# Patient Record
Sex: FEMALE | Race: WHITE | Employment: UNEMPLOYED | URBAN - METROPOLITAN AREA
[De-identification: names, ages, dates, MRNs, and addresses within clinical notes are randomized per-mention and may not be internally consistent; named-entity substitution may affect disease eponyms.]

---

## 2024-10-29 ENCOUNTER — HOSPITAL ENCOUNTER (EMERGENCY)
Facility: HOSPITAL | Age: 49
Discharge: HOME | End: 2024-10-29
Attending: EMERGENCY MEDICINE | Admitting: EMERGENCY MEDICINE

## 2024-10-29 ENCOUNTER — APPOINTMENT (EMERGENCY)
Dept: CT IMAGING | Facility: HOSPITAL | Age: 49
End: 2024-10-29

## 2024-10-29 ENCOUNTER — APPOINTMENT (EMERGENCY)
Dept: ULTRASOUND IMAGING | Facility: HOSPITAL | Age: 49
End: 2024-10-29

## 2024-10-29 VITALS
HEART RATE: 82 BPM | WEIGHT: 168.87 LBS | OXYGEN SATURATION: 100 % | RESPIRATION RATE: 20 BRPM | TEMPERATURE: 98.2 F | DIASTOLIC BLOOD PRESSURE: 90 MMHG | SYSTOLIC BLOOD PRESSURE: 160 MMHG

## 2024-10-29 DIAGNOSIS — N70.93 PYOSALPINX: Primary | ICD-10-CM

## 2024-10-29 LAB
ALBUMIN SERPL BCG-MCNC: 3.9 G/DL (ref 3.5–5)
ALP SERPL-CCNC: 58 U/L (ref 34–104)
ALT SERPL W P-5'-P-CCNC: 10 U/L (ref 7–52)
ANION GAP SERPL CALCULATED.3IONS-SCNC: 12 MMOL/L (ref 4–13)
AST SERPL W P-5'-P-CCNC: 12 U/L (ref 13–39)
BACTERIA UR QL AUTO: ABNORMAL /HPF
BASOPHILS # BLD AUTO: 0.04 THOUSANDS/ΜL (ref 0–0.1)
BASOPHILS NFR BLD AUTO: 0 % (ref 0–1)
BILIRUB SERPL-MCNC: 0.72 MG/DL (ref 0.2–1)
BILIRUB UR QL STRIP: NEGATIVE
BUN SERPL-MCNC: 8 MG/DL (ref 5–25)
CALCIUM SERPL-MCNC: 8.7 MG/DL (ref 8.4–10.2)
CHLORIDE SERPL-SCNC: 99 MMOL/L (ref 96–108)
CLARITY UR: CLEAR
CO2 SERPL-SCNC: 23 MMOL/L (ref 21–32)
COLOR UR: ABNORMAL
CREAT SERPL-MCNC: 0.9 MG/DL (ref 0.6–1.3)
EOSINOPHIL # BLD AUTO: 0.02 THOUSAND/ΜL (ref 0–0.61)
EOSINOPHIL NFR BLD AUTO: 0 % (ref 0–6)
ERYTHROCYTE [DISTWIDTH] IN BLOOD BY AUTOMATED COUNT: 12.6 % (ref 11.6–15.1)
EXT PREGNANCY TEST URINE: NEGATIVE
EXT. CONTROL: NORMAL
GFR SERPL CREATININE-BSD FRML MDRD: 76 ML/MIN/1.73SQ M
GLUCOSE SERPL-MCNC: 150 MG/DL (ref 65–140)
GLUCOSE UR STRIP-MCNC: NEGATIVE MG/DL
HCT VFR BLD AUTO: 36.2 % (ref 34.8–46.1)
HGB BLD-MCNC: 11.9 G/DL (ref 11.5–15.4)
HGB UR QL STRIP.AUTO: 25
HYALINE CASTS #/AREA URNS LPF: ABNORMAL /LPF
IMM GRANULOCYTES # BLD AUTO: 0.07 THOUSAND/UL (ref 0–0.2)
IMM GRANULOCYTES NFR BLD AUTO: 1 % (ref 0–2)
KETONES UR STRIP-MCNC: ABNORMAL MG/DL
LEUKOCYTE ESTERASE UR QL STRIP: 25
LIPASE SERPL-CCNC: 13 U/L (ref 11–82)
LYMPHOCYTES # BLD AUTO: 2.7 THOUSANDS/ΜL (ref 0.6–4.47)
LYMPHOCYTES NFR BLD AUTO: 22 % (ref 14–44)
MCH RBC QN AUTO: 29.5 PG (ref 26.8–34.3)
MCHC RBC AUTO-ENTMCNC: 32.9 G/DL (ref 31.4–37.4)
MCV RBC AUTO: 90 FL (ref 82–98)
MONOCYTES # BLD AUTO: 0.83 THOUSAND/ΜL (ref 0.17–1.22)
MONOCYTES NFR BLD AUTO: 7 % (ref 4–12)
MUCOUS THREADS UR QL AUTO: ABNORMAL
NEUTROPHILS # BLD AUTO: 8.39 THOUSANDS/ΜL (ref 1.85–7.62)
NEUTS SEG NFR BLD AUTO: 70 % (ref 43–75)
NITRITE UR QL STRIP: NEGATIVE
NON-SQ EPI CELLS URNS QL MICRO: ABNORMAL /HPF
NRBC BLD AUTO-RTO: 0 /100 WBCS
PH UR STRIP.AUTO: 8 [PH]
PLATELET # BLD AUTO: 269 THOUSANDS/UL (ref 149–390)
PMV BLD AUTO: 11.3 FL (ref 8.9–12.7)
POTASSIUM SERPL-SCNC: 3.6 MMOL/L (ref 3.5–5.3)
PROT SERPL-MCNC: 7.3 G/DL (ref 6.4–8.4)
PROT UR STRIP-MCNC: ABNORMAL MG/DL
RBC # BLD AUTO: 4.04 MILLION/UL (ref 3.81–5.12)
RBC #/AREA URNS AUTO: ABNORMAL /HPF
SODIUM SERPL-SCNC: 134 MMOL/L (ref 135–147)
SP GR UR STRIP.AUTO: 1.01 (ref 1–1.04)
UROBILINOGEN UA: 1 MG/DL
WBC # BLD AUTO: 12.05 THOUSAND/UL (ref 4.31–10.16)
WBC #/AREA URNS AUTO: ABNORMAL /HPF

## 2024-10-29 PROCEDURE — 96374 THER/PROPH/DIAG INJ IV PUSH: CPT

## 2024-10-29 PROCEDURE — 81001 URINALYSIS AUTO W/SCOPE: CPT

## 2024-10-29 PROCEDURE — 96375 TX/PRO/DX INJ NEW DRUG ADDON: CPT

## 2024-10-29 PROCEDURE — 74177 CT ABD & PELVIS W/CONTRAST: CPT

## 2024-10-29 PROCEDURE — NC001 PR NO CHARGE: Performed by: OBSTETRICS & GYNECOLOGY

## 2024-10-29 PROCEDURE — 87491 CHLMYD TRACH DNA AMP PROBE: CPT

## 2024-10-29 PROCEDURE — 93005 ELECTROCARDIOGRAM TRACING: CPT

## 2024-10-29 PROCEDURE — 36415 COLL VENOUS BLD VENIPUNCTURE: CPT

## 2024-10-29 PROCEDURE — 87591 N.GONORRHOEAE DNA AMP PROB: CPT

## 2024-10-29 PROCEDURE — 85025 COMPLETE CBC W/AUTO DIFF WBC: CPT

## 2024-10-29 PROCEDURE — 81003 URINALYSIS AUTO W/O SCOPE: CPT

## 2024-10-29 PROCEDURE — 76830 TRANSVAGINAL US NON-OB: CPT

## 2024-10-29 PROCEDURE — 81025 URINE PREGNANCY TEST: CPT

## 2024-10-29 PROCEDURE — 80053 COMPREHEN METABOLIC PANEL: CPT

## 2024-10-29 PROCEDURE — 99285 EMERGENCY DEPT VISIT HI MDM: CPT

## 2024-10-29 PROCEDURE — 83690 ASSAY OF LIPASE: CPT

## 2024-10-29 PROCEDURE — 76856 US EXAM PELVIC COMPLETE: CPT

## 2024-10-29 PROCEDURE — 99284 EMERGENCY DEPT VISIT MOD MDM: CPT

## 2024-10-29 PROCEDURE — 96361 HYDRATE IV INFUSION ADD-ON: CPT

## 2024-10-29 RX ORDER — KETOROLAC TROMETHAMINE 30 MG/ML
15 INJECTION, SOLUTION INTRAMUSCULAR; INTRAVENOUS ONCE
Status: COMPLETED | OUTPATIENT
Start: 2024-10-29 | End: 2024-10-29

## 2024-10-29 RX ORDER — ONDANSETRON 2 MG/ML
4 INJECTION INTRAMUSCULAR; INTRAVENOUS ONCE
Status: COMPLETED | OUTPATIENT
Start: 2024-10-29 | End: 2024-10-29

## 2024-10-29 RX ADMIN — KETOROLAC TROMETHAMINE 15 MG: 30 INJECTION, SOLUTION INTRAMUSCULAR; INTRAVENOUS at 10:09

## 2024-10-29 RX ADMIN — IOHEXOL 100 ML: 350 INJECTION, SOLUTION INTRAVENOUS at 11:19

## 2024-10-29 RX ADMIN — ONDANSETRON 4 MG: 2 INJECTION INTRAMUSCULAR; INTRAVENOUS at 10:09

## 2024-10-29 RX ADMIN — SODIUM CHLORIDE 1000 ML: 0.9 INJECTION, SOLUTION INTRAVENOUS at 09:50

## 2024-10-29 NOTE — DISCHARGE INSTR - AVS FIRST PAGE
Please call [phone] prior to your appointment on 10/31 to talk to the financial counselors about insurance!

## 2024-10-29 NOTE — ED PROVIDER NOTES
Time reflects when diagnosis was documented in both MDM as applicable and the Disposition within this note       Time User Action Codes Description Comment    10/29/2024  2:55 PM Mary Garcia Add [N70.93] Pyosalpinx           ED Disposition       ED Disposition   Discharge    Condition   Stable    Date/Time   Tue Oct 29, 2024  3:18 PM    Comment   Yuki Philip discharge to home/self care.                   Assessment & Plan       Medical Decision Making  49 year old female presenting to the ED for left lower quadrant abdominal/pelvic pain x three weeks. On exam there is no peritonitis but is tender to palpation. Differential diagnosis to include but not limited to: PID, ovarian torsion, diverticulitis. CBC with minor leukocytosis at 12, no tachycardia or fever to associate. CMP with hyperglycemia. UA without signs for infection. Negative pregnancy test. CT returned with 6.0 x 1.6 cm mildly thick walled tubular structure with central hypodensity in the left adnexa possibly representing fallopian tube with hydrosalpinx/pyosalpinx. Some heterogeneity in the left adnexa more inferiorly, with poorly defined left ovary. Possibility of PID/early tubo-ovarian abscess cannot be completely excluded.  Correlate with physical exam and pelvic ultrasound. 2. Small amount of pelvic free fluid with suspected enhancement of the pelvic peritoneum, possibly reactive from regional inflammation/infection. 3. Mildly thickened urinary bladder possibly underdistention versus cystitis.  Ultrasound returned with Dilated/thickened left fallopian tube with central fluid. Given the additional findings on CT, this is concerning for pyosalpinx and clinical correlation is recommended. Hematosalpinx secondary to endometriosis or ectopic pregnancy is less likely. 2.  Left ovary difficult to discretely visualize secondary to adjacent thickened tube though there do appear to be normal small follicles. 3.  Heterogeneous myometrium likely related  "to adenomyosis. A small focus of hypoechogenicity is likely related to adenomyosis with fibroid less likely.    Discussed case with Dr. Torres from [facility]. Stated she is stable for outpatient management without needing IV ABX or oral ABX. Will need to follow up in office in two days for surgical consult. Patient and family verbalize understanding.    Pt stable at time of discharge, vital signs reviewed, questions answered. Strict ER return precautions provided/discussed and were well understood by patient. Patient's vitals, labs and/or imaging results, diagnosis, and treatment plan were discussed with the patient. All new and/or changed medications were discussed - specifically to include route of administration, how often to take, when to take, and the pharmacy they were sent to. Strict return precautions as well as close follow up with PCP was discussed with the patient and the patient was agreeable to my recommendations.  Patient verbally acknowledged understanding. All labs, imaging were reviewed and used in the medical decision making process (if ordered).     Portions of this chart may have been written with voice recognition software.  Occasional grammatical errors, wrong word or \"sound a like\" substitutions may have occurred due to software limitations.  Please read carefully and use context to recognize where substitutions have occurred.      Problems Addressed:  Pyosalpinx: acute illness or injury    Amount and/or Complexity of Data Reviewed  Labs: ordered. Decision-making details documented in ED Course.  Radiology: ordered. Decision-making details documented in ED Course.  Discussion of management or test interpretation with external provider(s): Discussed case with Dr. Torres with [facility]. He states we could admit for IV ABX, though possible the ABX would not penetrate this possible chronic infection. Vital signs are stable and no overtly elevated leukocytosis. If pt feels comfortable with discharge " home, could follow up outpatient this Thursday (two days) for consult regarding removing the fallopian tube.    Risk  Prescription drug management.  Decision regarding hospitalization.        ED Course as of 10/29/24 1555   Tue Oct 29, 2024   1013 WBC(!): 12.05   1013 PREGNANCY TEST URINE: Negative   1016 Leukocytes, UA(!): 25.0   1016 Blood, UA(!): 25.0   1031 GLUCOSE(!): 150   1031 LIPASE: 13   1149 CT abdomen pelvis with contrast   1150 CT abdomen pelvis with contrast  Left Fallopian thickening    1210 CT abdomen pelvis with contrast  IMPRESSION:     1. 6.0 x 1.6 cm mildly thick walled tubular structure with central hypodensity in the left adnexa possibly representing fallopian tube with hydrosalpinx/pyosalpinx. Some heterogeneity in the left adnexa more inferiorly, with poorly defined left ovary.   Possibility of PID/early tubo-ovarian abscess cannot be completely excluded.  Correlate with physical exam and pelvic ultrasound.     2. Small amount of pelvic free fluid with suspected enhancement of the pelvic peritoneum, possibly reactive from regional inflammation/infection.     3. Mildly thickened urinary bladder possibly underdistention versus cystitis.     1355 US pelvis complete w transvaginal  IMPRESSION:     1.  Dilated/thickened left fallopian tube with central fluid. Given the additional findings on CT, this is concerning for pyosalpinx and clinical correlation is recommended. Hematosalpinx secondary to endometriosis or ectopic pregnancy is less likely.     2.  Left ovary difficult to discretely visualize secondary to adjacent thickened tube though there do appear to be normal small follicles.     3.  Heterogeneous myometrium likely related to adenomyosis. A small focus of hypoechogenicity is likely related to adenomyosis with fibroid less likely.            Medications   sodium chloride 0.9 % bolus 1,000 mL (0 mL Intravenous Stopped 10/29/24 1100)   ketorolac (TORADOL) injection 15 mg (15 mg Intravenous  Given 10/29/24 1009)   ondansetron (ZOFRAN) injection 4 mg (4 mg Intravenous Given 10/29/24 1009)       ED Risk Strat Scores                                               History of Present Illness       Chief Complaint   Patient presents with    Abdominal Pain       No past medical history on file.   No past surgical history on file.   No family history on file.       No existing history information found.   No existing history information found.   I have reviewed and agree with the history as documented.     Yuki is a 49 year old female presenting to the ED for left lower abdominal pain and periumbilical abdominal pain x three weeks. States her last bowel movement was sometime in the last month. Admits to fever last night. Denies chest pain, palpitations, cough, shortness of breath, nausea, vomiting, diarrhea, dysuria, urinary frequency, urinary urgency, hematuria. Unsure LMP. The pain is severe, has been this severe for the previous three weeks, sometimes the pain does radiate to the flank. Coming in today due to pain when trying to stand up, though she has been having this for three weeks.        Review of Systems   Constitutional:  Positive for fever. Negative for chills.   HENT:  Negative for congestion and rhinorrhea.    Respiratory:  Negative for cough and shortness of breath.    Cardiovascular:  Negative for chest pain and palpitations.   Gastrointestinal:  Positive for abdominal pain and constipation (? maybe). Negative for diarrhea, nausea and vomiting.   Genitourinary:  Positive for flank pain. Negative for decreased urine volume, dysuria, frequency, hematuria, urgency, vaginal bleeding, vaginal discharge and vaginal pain.   Musculoskeletal:  Negative for myalgias, neck pain and neck stiffness.   Skin:  Negative for rash.   Neurological:  Negative for light-headedness and headaches.           Objective       ED Triage Vitals [10/29/24 0942]   Temperature Pulse Blood Pressure Respirations SpO2 Patient  Position - Orthostatic VS   98.2 °F (36.8 °C) 82 160/90 20 100 % Lying      Temp Source Heart Rate Source BP Location FiO2 (%) Pain Score    Oral Monitor Right arm -- 10 - Worst Possible Pain      Vitals      Date and Time Temp Pulse SpO2 Resp BP Pain Score FACES Pain Rating User   10/29/24 1101 -- -- -- -- -- 5 -- AS   10/29/24 1009 -- -- -- -- -- 10 - Worst Possible Pain -- AS   10/29/24 0942 98.2 °F (36.8 °C) 82 100 % 20 160/90 10 - Worst Possible Pain -- AS            Physical Exam  Vitals reviewed.   Constitutional:       General: She is in acute distress.      Appearance: Normal appearance. She is ill-appearing. She is not toxic-appearing or diaphoretic.      Comments: Upon arrival to the ED patient is screaming, yelling in pain. Grabbing her left lower abdomen rather than pelvic pain. Is also noting periumbilical pain.    HENT:      Head: Normocephalic and atraumatic.      Right Ear: External ear normal.      Left Ear: External ear normal.      Nose: Nose normal.   Eyes:      General: No scleral icterus.        Right eye: No discharge.         Left eye: No discharge.      Extraocular Movements: Extraocular movements intact.      Conjunctiva/sclera: Conjunctivae normal.   Cardiovascular:      Rate and Rhythm: Normal rate and regular rhythm.      Pulses: Normal pulses.      Heart sounds: Normal heart sounds.   Pulmonary:      Effort: Pulmonary effort is normal. No respiratory distress.      Breath sounds: Normal breath sounds.   Abdominal:      General: There is no distension.      Palpations: Abdomen is soft.      Tenderness: There is abdominal tenderness (generalized). There is no right CVA tenderness, left CVA tenderness, guarding or rebound.      Comments: No peritonitis.    Musculoskeletal:         General: Normal range of motion.      Cervical back: Normal range of motion and neck supple. No rigidity or tenderness.   Lymphadenopathy:      Cervical: No cervical adenopathy.   Skin:     General: Skin is warm  and dry.      Capillary Refill: Capillary refill takes less than 2 seconds.   Neurological:      General: No focal deficit present.      Mental Status: She is alert.         Results Reviewed       Procedure Component Value Units Date/Time    Chlamydia/GC amplified DNA by PCR [[id number]] Collected: 10/29/24 1427    Lab Status: In process Updated: 10/29/24 1427    Urine Microscopic [[id number]]  (Abnormal) Collected: 10/29/24 1002    Lab Status: Final result Specimen: Urine, Clean Catch Updated: 10/29/24 1049     RBC, UA 0-1 /hpf      WBC, UA 1-2 /hpf      Epithelial Cells Moderate /hpf      Bacteria, UA None Seen /hpf      Hyaline Casts, UA 2-4 /lpf      MUCUS THREADS Moderate    Comprehensive metabolic panel [[id number]]  (Abnormal) Collected: 10/29/24 0951    Lab Status: Final result Specimen: Blood from Arm, Left Updated: 10/29/24 1027     Sodium 134 mmol/L      Potassium 3.6 mmol/L      Chloride 99 mmol/L      CO2 23 mmol/L      ANION GAP 12 mmol/L      BUN 8 mg/dL      Creatinine 0.90 mg/dL      Glucose 150 mg/dL      Calcium 8.7 mg/dL      AST 12 U/L      ALT 10 U/L      Alkaline Phosphatase 58 U/L      Total Protein 7.3 g/dL      Albumin 3.9 g/dL      Total Bilirubin 0.72 mg/dL      eGFR 76 ml/min/1.73sq m     Narrative:      National Kid[employer] guidelines for Chronic Kidney Disease (CKD):     Stage 1 with normal or high GFR (GFR > 90 mL/min/1.73 square meters)    Stage 2 Mild CKD (GFR = 60-89 mL/min/1.73 square meters)    Stage 3A Moderate CKD (GFR = 45-59 mL/min/1.73 square meters)    Stage 3B Moderate CKD (GFR = 30-44 mL/min/1.73 square meters)    Stage 4 Severe CKD (GFR = 15-29 mL/min/1.73 square meters)    Stage 5 End Stage CKD (GFR <15 mL/min/1.73 square meters)  Note: GFR calculation is accurate only with a steady state creatinine    Lipase [[id number]]  (Normal) Collected: 10/29/24 0951    Lab Status: Final result Specimen: Blood from Arm, Left Updated: 10/29/24 1027     Lipase 13 u/L      UA w Reflex to Microscopic w Reflex to Culture [[id number]]  (Abnormal) Collected: 10/29/24 1002    Lab Status: Final result Specimen: Urine, Clean Catch Updated: 10/29/24 1016     Color, UA Yessenia     Clarity, UA Clear     Specific Gravity, UA 1.010     pH, UA 8.0     Leukocytes, UA 25.0     Nitrite, UA Negative     Protein, UA 30 (1+) mg/dl      Glucose, UA Negative mg/dl      Ketones, UA 50 (2+) mg/dl      Bilirubin, UA Negative     Occult Blood, UA 25.0     UROBILINOGEN UA 1.0 mg/dL     POCT pregnancy, urine [[id number]]  (Normal) Resulted: 10/29/24 1004    Lab Status: Final result Updated: 10/29/24 1004     EXT Preg Test, Ur Negative     Control Valid    CBC and differential [[id number]]  (Abnormal) Collected: 10/29/24 0951    Lab Status: Final result Specimen: Blood from Arm, Left Updated: 10/29/24 1004     WBC 12.05 Thousand/uL      RBC 4.04 Million/uL      Hemoglobin 11.9 g/dL      Hematocrit 36.2 %      MCV 90 fL      MCH 29.5 pg      MCHC 32.9 g/dL      RDW 12.6 %      MPV 11.3 fL      Platelets 269 Thousands/uL      nRBC 0 /100 WBCs      Segmented % 70 %      Immature Grans % 1 %      Lymphocytes % 22 %      Monocytes % 7 %      Eosinophils Relative 0 %      Basophils Relative 0 %      Absolute Neutrophils 8.39 Thousands/µL      Absolute Immature Grans 0.07 Thousand/uL      Absolute Lymphocytes 2.70 Thousands/µL      Absolute Monocytes 0.83 Thousand/µL      Eosinophils Absolute 0.02 Thousand/µL      Basophils Absolute 0.04 Thousands/µL             US pelvis complete w transvaginal   Final Interpretation by Catalino Langley MD (10/29 1332)      1.  Dilated/thickened left fallopian tube with central fluid. Given the additional findings on CT, this is concerning for pyosalpinx and clinical correlation is recommended. Hematosalpinx secondary to endometriosis or ectopic pregnancy is less likely.      2.  Left ovary difficult to discretely visualize secondary to adjacent thickened tube though there do appear to be  normal small follicles.      3.  Heterogeneous myometrium likely related to adenomyosis. A small focus of hypoechogenicity is likely related to adenomyosis with fibroid less likely.                           Workstation performed: [id number]         CT abdomen pelvis with contrast   Final Interpretation by Tin Stafford DO (10/29 1158)      1. 6.0 x 1.6 cm mildly thick walled tubular structure with central hypodensity in the left adnexa possibly representing fallopian tube with hydrosalpinx/pyosalpinx. Some heterogeneity in the left adnexa more inferiorly, with poorly defined left ovary.    Possibility of PID/early tubo-ovarian abscess cannot be completely excluded.  Correlate with physical exam and pelvic ultrasound.      2. Small amount of pelvic free fluid with suspected enhancement of the pelvic peritoneum, possibly reactive from regional inflammation/infection.      3. Mildly thickened urinary bladder possibly underdistention versus cystitis.      I personally discussed this study with JO CARDONA on 10/29/2024 at 11:52 AM.            Workstation performed: [id number]             Procedures    ED Medication and Procedure Management   None     There are no discharge medications for this patient.    No discharge procedures on file.  ED SEPSIS DOCUMENTATION   Time reflects when diagnosis was documented in both MDM as applicable and the Disposition within this note       Time User Action Codes Description Comment    10/29/2024  2:55 PM Jo Cardona Add [N70.93] Pyosalpinx                  Jo Cardona PA-C  10/29/24 1555

## 2024-10-29 NOTE — TELEMEDICINE
"e-Consult ([facility]) - OB/GYN   Name: Yuki Philip 49 y.o. female I MRN: [id number]  Unit/Bed#: FT 01 I Date of Admission: 10/29/2024   Date of Service: 10/29/2024 I Hospital Day: 0   Consult to Obstetrics / Gynecology  Consult performed by: Dominguez Torres MD  Consult ordered by: Mary Garcia PA-C        Physician Requesting Evaluation: No att. providers found   Reason for Evaluation / Principal Problem: Pelvic pain with abnormal ultrasound    ASSESSMENT:  49 y.o. female with severe left lower quadrant/pelvic pain x 3 weeks, worst today, relieved with IV toradol.     CBC with WBC 12, no fever, no peritoneal signs.    Her ultrasound resulted with \"Dilated/thickened left fallopian tube with central fluid. Given the additional findings on CT, this is concerning for pyosalpinx and clinical correlation is recommended. Hematosalpinx secondary to endometriosis or ectopic pregnancy is less likely. 2. Left ovary difficult to discretely visualize secondary to adjacent thickened tube though there do appear to be normal small follicles. 3. Heterogeneous myometrium likely related to adenomyosis. A small focus of hypoechogenicity is likely related to adenomyosis with fibroid less likely.\"    RECOMMENDATIONS:  Given patient's resolution of pain with 1 dose of IV Toradol, and overall reassuring clinical signs, aggressive treatment with IV antibiotics is not warranted at this time IV antibiotics.,  Although indicated for tubo-ovarian abscess which is a similar diagnosis, are unlikely to achieve complete resolution.  The most definitive treatment for pyosalpinx is surgical removal (bilateral salpingectomy).     With the ultrasound and CT findings of inflammation,  poor visualization of the ovary, and with the possible diagnosis of endometriosis there is potential for significant adhesive disease adjacent to and including the fallopian tubes.  Extensive preoperative counseling regarding the risks of salpingectomy " should be undertaken as part of a complete GYN consult at a later date.     Patient is a good candidate for outpatient follow-up and I can see her in the office in 2 days. Appointment made.    11-20 minutes, >50% of the total time devoted to medical consultative verbal/EMR discussion between providers. Written report will be generated in the EMR.

## 2024-10-30 LAB
ATRIAL RATE: 80 BPM
C TRACH DNA SPEC QL NAA+PROBE: NEGATIVE
N GONORRHOEA DNA SPEC QL NAA+PROBE: NEGATIVE
P AXIS: 50 DEGREES
PR INTERVAL: 136 MS
QRS AXIS: 9 DEGREES
QRSD INTERVAL: 70 MS
QT INTERVAL: 364 MS
QTC INTERVAL: 419 MS
T WAVE AXIS: 17 DEGREES
VENTRICULAR RATE: 80 BPM

## 2024-10-30 PROCEDURE — 93010 ELECTROCARDIOGRAM REPORT: CPT | Performed by: INTERNAL MEDICINE

## 2024-10-31 ENCOUNTER — OFFICE VISIT (OUTPATIENT)
Dept: OBGYN CLINIC | Facility: CLINIC | Age: 49
End: 2024-10-31

## 2024-10-31 VITALS
BODY MASS INDEX: 28.99 KG/M2 | DIASTOLIC BLOOD PRESSURE: 75 MMHG | HEART RATE: 90 BPM | HEIGHT: 65 IN | WEIGHT: 174 LBS | SYSTOLIC BLOOD PRESSURE: 116 MMHG

## 2024-10-31 DIAGNOSIS — N70.93 PYOSALPINX: Primary | ICD-10-CM

## 2024-10-31 PROCEDURE — 99203 OFFICE O/P NEW LOW 30 MIN: CPT | Performed by: OBSTETRICS & GYNECOLOGY

## 2024-10-31 NOTE — PROGRESS NOTES
"OB/GYN VISIT  Yuki Philip  10/31/2024  1:16 PM    Subjective:     Yuki Philip is a 49 y.o.  female who presents for pelvic pain after being seen in the ED 2 days ago for pelvic pain and was found to have a pyosalpinx.  She for started having this pain 1 week ago.  She is visiting from the [place].  She is returning to the [place] in 5 days.  She was counseled that surgery is the best option for pyosalpinx as often IV antibiotics will not penetrate the fallopian tube and oral antibiotics are not the standard of care.  Patient reports she will speak to her GYN when going back to the [place] and come up with a plan.  Patient reports she may consider coming back to the [place] in 3 months to consider undergoing a procedure. Today she is still have pain but feels that it is manageable with pain meds      Past Medical History:   Diagnosis Date    Hypertension      Past Surgical History:   Procedure Laterality Date     SECTION Bilateral     COSMETIC SURGERY Right        Objective:    Vitals: Blood pressure 116/75, pulse 90, height 5' 5\" (1.651 m), weight 78.9 kg (174 lb), last menstrual period 10/30/2024.Body mass index is 28.96 kg/m².    GEN: The patient was alert and oriented x3, pleasant well-appearing female in no acute distress.   CV: Regular rate  PULM: nonlabored respirations  MSK: Normal gait  Skin: warm, dry  Neuro: no focal deficits  Psych: normal affect and judgement, cooperative      Assessment/Plan:  Problem List Items Addressed This Visit       Pyosalpinx - Primary     Patient with pyosalpinx on ultrasound, no distinct TOA seen  Recommend bilateral salpingectomy  Patient returning to [place], will speak to her gynecologist there, will consider having surgery in the [place] versus here            D/w Dr. Brian Garcia, DO  10/31/2024  1:16 PM        Please note that while the recent CURES Act permits medical records " be visible for patient review, clinical documentation is intended for utilization by healthcare professionals.  All test results are immediately available through [employer] as well, and we will review results at earliest opportunity and contact you with the appropriate urgency.  If you have a question about something you see in your chart, please don't hesitate to ask about it at your next appointment.

## 2024-10-31 NOTE — ASSESSMENT & PLAN NOTE
Patient with pyosalpinx on ultrasound, no distinct TOA seen  Recommend bilateral salpingectomy  Patient returning to [place], will speak to her gynecologist there, will consider having surgery in the [place] versus here